# Patient Record
Sex: FEMALE | Race: OTHER | NOT HISPANIC OR LATINO | Employment: STUDENT | URBAN - METROPOLITAN AREA
[De-identification: names, ages, dates, MRNs, and addresses within clinical notes are randomized per-mention and may not be internally consistent; named-entity substitution may affect disease eponyms.]

---
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
## 2025-04-08 ENCOUNTER — OFFICE VISIT (OUTPATIENT)
Dept: INTERNAL MEDICINE | Facility: CLINIC | Age: 20
End: 2025-04-08
Payer: MEDICAID

## 2025-04-08 ENCOUNTER — LAB VISIT (OUTPATIENT)
Dept: LAB | Facility: HOSPITAL | Age: 20
End: 2025-04-08
Payer: MEDICAID

## 2025-04-08 VITALS
WEIGHT: 191 LBS | RESPIRATION RATE: 18 BRPM | SYSTOLIC BLOOD PRESSURE: 107 MMHG | BODY MASS INDEX: 29.98 KG/M2 | HEIGHT: 67 IN | TEMPERATURE: 98 F | HEART RATE: 96 BPM | DIASTOLIC BLOOD PRESSURE: 70 MMHG | OXYGEN SATURATION: 98 %

## 2025-04-08 DIAGNOSIS — M54.50 CHRONIC LOW BACK PAIN, UNSPECIFIED BACK PAIN LATERALITY, UNSPECIFIED WHETHER SCIATICA PRESENT: ICD-10-CM

## 2025-04-08 DIAGNOSIS — G89.29 CHRONIC LOW BACK PAIN, UNSPECIFIED BACK PAIN LATERALITY, UNSPECIFIED WHETHER SCIATICA PRESENT: ICD-10-CM

## 2025-04-08 DIAGNOSIS — Z76.89 ENCOUNTER TO ESTABLISH CARE: ICD-10-CM

## 2025-04-08 DIAGNOSIS — Z76.89 ENCOUNTER TO ESTABLISH CARE: Primary | ICD-10-CM

## 2025-04-08 LAB
BACTERIA #/AREA URNS AUTO: ABNORMAL /HPF
BILIRUB UR QL STRIP.AUTO: NEGATIVE
C TRACH DNA SPEC QL NAA+PROBE: NOT DETECTED
CLARITY UR: CLEAR
COLOR UR AUTO: ABNORMAL
GLUCOSE UR QL STRIP: NORMAL
HCV AB SERPL QL IA: NONREACTIVE
HGB UR QL STRIP: NEGATIVE
HIV 1+2 AB+HIV1 P24 AG SERPL QL IA: NONREACTIVE
HYALINE CASTS #/AREA URNS LPF: ABNORMAL /LPF
KETONES UR QL STRIP: ABNORMAL
LEUKOCYTE ESTERASE UR QL STRIP: NEGATIVE
MUCOUS THREADS URNS QL MICRO: ABNORMAL /LPF
N GONORRHOEA DNA SPEC QL NAA+PROBE: NOT DETECTED
NITRITE UR QL STRIP: NEGATIVE
PH UR STRIP: 6 [PH]
PROT UR QL STRIP: NEGATIVE
RBC #/AREA URNS AUTO: ABNORMAL /HPF
SOURCE (OHS): NORMAL
SP GR UR STRIP.AUTO: 1.02 (ref 1–1.03)
SQUAMOUS #/AREA URNS LPF: ABNORMAL /HPF
UROBILINOGEN UR STRIP-ACNC: NORMAL
WBC #/AREA URNS AUTO: ABNORMAL /HPF

## 2025-04-08 PROCEDURE — 99214 OFFICE O/P EST MOD 30 MIN: CPT | Mod: PBBFAC

## 2025-04-08 PROCEDURE — 86803 HEPATITIS C AB TEST: CPT

## 2025-04-08 PROCEDURE — 81015 MICROSCOPIC EXAM OF URINE: CPT

## 2025-04-08 PROCEDURE — 36415 COLL VENOUS BLD VENIPUNCTURE: CPT

## 2025-04-08 PROCEDURE — 87491 CHLMYD TRACH DNA AMP PROBE: CPT

## 2025-04-08 PROCEDURE — 87389 HIV-1 AG W/HIV-1&-2 AB AG IA: CPT

## 2025-04-08 NOTE — PROGRESS NOTES
I have reviewed and agree with the resident's findings, including all diagnostic interpretations and plans as written.    Lakia Dejesus MD

## 2025-04-08 NOTE — PROGRESS NOTES
"  [facility] Internal Medicine Clinic Visit    Chief Complaint:      Pain (Patient c/o pain to back and neck that started about 1 year ago. 6/10)     Subjective:     HPI:  Ama Quan is a 19 y.o. female with no past medical history.  She presents to clinic today to establish care.  Her primary complaint today is lower back pain that she has had for over the last year.  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  She states that the pain improves with Tylenol.  No other complaints.  She takes no medications.  Family history significant for type 2 diabetes mellitus and father.  No other medical issues with father or mother.  Has 3 sisters without any medical issues.  Patient denies alcohol use.  She denies tobacco use.  She is not sexually active.  Has normal menses.  Patient reports that she is traveling to [place] next month to get .  She feels well.    Review of Systems  A comprehensive 12 point review of systems was completed.  Please see above for pertinent positives and negatives.     Objective:   Last 24 Hour Vital Signs:  Vitals  BP: 107/70  Temp: 97.7 °F (36.5 °C)  Temp Source: Oral  Pulse: 96  Resp: 18  SpO2: 98 %  Height: 5' 7" (170.2 cm)  Weight: 86.6 kg (191 lb)    Physical Examination:  General: Awake, alert, & oriented to person, place & time. No acute distress  Psychiatric: Mood and affect normal  HEENT: Normocephalic, atraumatic. Face symmetric.  Cardiovascular: Regular rate & rhythm. Normal S1 & S2 w/out murmurs, rubs or gallops.  Pulmonary: Bilateral symmetric chest rise. Non-labored, no wheezes, rhonchi or crackles are appreciated.  Abdominal:  Nondistended. Bowel sounds present  Extremities: No clubbing, cyanosis or edema.  Skin:  Exposed skin is warm & dry.  Neuro:   Patient moves all extremities equally. Sensation intact bilateraly.    Labs  CMP:   Recent Labs   Lab 01/19/23  0019 02/23/23  1629 11/13/24  2209   Sodium 139 139 141   Potassium 3.9 4.8 3.7   CO2 24 " 27 25   Blood Urea Nitrogen 16.6 13.9 10.8   Creatinine 0.81 0.72 0.69   Glucose 95 66 L 87   Calcium 9.5 10.0 9.0   Albumin 3.9 4.1 3.7   Bilirubin Total 0.3 0.4 0.2   AST 18 22 15   ALT 23 28 19   ALP 83 76 60   ,   CBC:   Recent Labs   Lab 01/19/23  0019 02/23/23  1629 11/13/24 2209   WBC 12.0 H 9.9 12.05 H   Neut # 6.78 6.20 6.68   RBC 4.87 5.28 4.51   Hgb 13.2 14.5 13.1   Hct 40.3 43.4 38.8   Platelet 278 279 256   MCV 82.8 82.2 86.0   RDW 13.5 13.5 13.5   ,   DM:   Recent Labs   Lab 01/19/23 0019 02/23/23 1629 11/13/24 2209   Creatinine 0.81 0.72 0.69   ,   FLP:      ,   Thyroid:     ,   Anemia:   Recent Labs   Lab 11/13/24 2209   Hgb 13.1   Hct 38.8        Assessment & Plan:     Chronic back pain  -lumbar, associated with long periods of standing  -takes Tylenol with relief  -informed patient that she could take Tylenol and ibuprofen alternating every 6 hours  -recommended rest every few hours during shift, stated that she will have them month off for wedding    Establish care  -we will do baseline lab work including hepatitis-C, lipid, HIV, GC/chlamydia screening, CBC, CMP  -no medical history and not taking any medications at this time    To be addressed at next follow-up  -return to clinic in 1 year.  We will discuss annual OB gyn wellness, Pap smears to start at age 21.        Bryce Jeffrey MD  Internal Medicine - PGY-3

## 2025-04-09 PROBLEM — M54.50 CHRONIC LOW BACK PAIN: Status: ACTIVE | Noted: 2025-04-09

## 2025-04-09 PROBLEM — G89.29 CHRONIC LOW BACK PAIN: Status: ACTIVE | Noted: 2025-04-09
